# Patient Record
Sex: MALE | Race: WHITE | URBAN - METROPOLITAN AREA
[De-identification: names, ages, dates, MRNs, and addresses within clinical notes are randomized per-mention and may not be internally consistent; named-entity substitution may affect disease eponyms.]

---

## 2017-03-10 ENCOUNTER — OFFICE VISIT (OUTPATIENT)
Dept: FAMILY MEDICINE CLINIC | Age: 32
End: 2017-03-10

## 2017-03-10 VITALS
BODY MASS INDEX: 37.9 KG/M2 | RESPIRATION RATE: 15 BRPM | TEMPERATURE: 98.7 F | HEART RATE: 60 BPM | WEIGHT: 311.4 LBS | SYSTOLIC BLOOD PRESSURE: 130 MMHG | DIASTOLIC BLOOD PRESSURE: 70 MMHG

## 2017-03-10 DIAGNOSIS — F41.9 CHRONIC ANXIETY: ICD-10-CM

## 2017-03-10 PROCEDURE — 99213 OFFICE O/P EST LOW 20 MIN: CPT | Performed by: FAMILY MEDICINE

## 2017-03-10 PROCEDURE — G0444 DEPRESSION SCREEN ANNUAL: HCPCS | Performed by: FAMILY MEDICINE

## 2017-03-10 RX ORDER — ALPRAZOLAM 0.5 MG/1
0.5 TABLET ORAL 3 TIMES DAILY PRN
Qty: 20 TABLET | Refills: 0 | Status: SHIPPED | OUTPATIENT
Start: 2017-03-10 | End: 2017-04-17

## 2017-03-10 RX ORDER — PAROXETINE HYDROCHLORIDE 20 MG/1
20 TABLET, FILM COATED ORAL DAILY
Qty: 30 TABLET | Refills: 3 | Status: SHIPPED | OUTPATIENT
Start: 2017-03-10 | End: 2017-04-17 | Stop reason: SDUPTHER

## 2017-03-10 ASSESSMENT — PATIENT HEALTH QUESTIONNAIRE - PHQ9
SUM OF ALL RESPONSES TO PHQ QUESTIONS 1-9: 13
3. TROUBLE FALLING OR STAYING ASLEEP: 3
9. THOUGHTS THAT YOU WOULD BE BETTER OFF DEAD, OR OF HURTING YOURSELF: 0
2. FEELING DOWN, DEPRESSED OR HOPELESS: 1
1. LITTLE INTEREST OR PLEASURE IN DOING THINGS: 2
8. MOVING OR SPEAKING SO SLOWLY THAT OTHER PEOPLE COULD HAVE NOTICED. OR THE OPPOSITE, BEING SO FIGETY OR RESTLESS THAT YOU HAVE BEEN MOVING AROUND A LOT MORE THAN USUAL: 0
7. TROUBLE CONCENTRATING ON THINGS, SUCH AS READING THE NEWSPAPER OR WATCHING TELEVISION: 1
10. IF YOU CHECKED OFF ANY PROBLEMS, HOW DIFFICULT HAVE THESE PROBLEMS MADE IT FOR YOU TO DO YOUR WORK, TAKE CARE OF THINGS AT HOME, OR GET ALONG WITH OTHER PEOPLE: 2
SUM OF ALL RESPONSES TO PHQ9 QUESTIONS 1 & 2: 3
4. FEELING TIRED OR HAVING LITTLE ENERGY: 1
6. FEELING BAD ABOUT YOURSELF - OR THAT YOU ARE A FAILURE OR HAVE LET YOURSELF OR YOUR FAMILY DOWN: 2
5. POOR APPETITE OR OVEREATING: 3

## 2017-04-17 ENCOUNTER — OFFICE VISIT (OUTPATIENT)
Dept: FAMILY MEDICINE CLINIC | Age: 32
End: 2017-04-17

## 2017-04-17 VITALS
SYSTOLIC BLOOD PRESSURE: 124 MMHG | WEIGHT: 315 LBS | HEART RATE: 68 BPM | HEIGHT: 75 IN | TEMPERATURE: 98.1 F | BODY MASS INDEX: 39.17 KG/M2 | DIASTOLIC BLOOD PRESSURE: 70 MMHG | OXYGEN SATURATION: 98 % | RESPIRATION RATE: 18 BRPM

## 2017-04-17 DIAGNOSIS — F41.9 CHRONIC ANXIETY: Primary | ICD-10-CM

## 2017-04-17 PROCEDURE — 99213 OFFICE O/P EST LOW 20 MIN: CPT | Performed by: FAMILY MEDICINE

## 2017-04-17 RX ORDER — PAROXETINE 30 MG/1
30 TABLET, FILM COATED ORAL DAILY
Qty: 30 TABLET | Refills: 2 | Status: SHIPPED | OUTPATIENT
Start: 2017-04-17 | End: 2017-08-22 | Stop reason: SDUPTHER

## 2017-08-22 RX ORDER — PAROXETINE 30 MG/1
30 TABLET, FILM COATED ORAL DAILY
Qty: 30 TABLET | Refills: 3 | Status: SHIPPED | OUTPATIENT
Start: 2017-08-22 | End: 2020-07-02

## 2020-07-02 ENCOUNTER — OFFICE VISIT (OUTPATIENT)
Dept: FAMILY MEDICINE CLINIC | Age: 35
End: 2020-07-02
Payer: COMMERCIAL

## 2020-07-02 VITALS
HEIGHT: 75 IN | OXYGEN SATURATION: 98 % | HEART RATE: 68 BPM | DIASTOLIC BLOOD PRESSURE: 70 MMHG | WEIGHT: 315 LBS | BODY MASS INDEX: 39.17 KG/M2 | SYSTOLIC BLOOD PRESSURE: 136 MMHG | TEMPERATURE: 96.7 F

## 2020-07-02 PROCEDURE — 90472 IMMUNIZATION ADMIN EACH ADD: CPT | Performed by: NURSE PRACTITIONER

## 2020-07-02 PROCEDURE — 90715 TDAP VACCINE 7 YRS/> IM: CPT | Performed by: NURSE PRACTITIONER

## 2020-07-02 PROCEDURE — 90471 IMMUNIZATION ADMIN: CPT | Performed by: NURSE PRACTITIONER

## 2020-07-02 PROCEDURE — 99385 PREV VISIT NEW AGE 18-39: CPT | Performed by: NURSE PRACTITIONER

## 2020-07-02 PROCEDURE — 36415 COLL VENOUS BLD VENIPUNCTURE: CPT | Performed by: NURSE PRACTITIONER

## 2020-07-02 PROCEDURE — 90732 PPSV23 VACC 2 YRS+ SUBQ/IM: CPT | Performed by: NURSE PRACTITIONER

## 2020-07-02 ASSESSMENT — ENCOUNTER SYMPTOMS
SHORTNESS OF BREATH: 0
COUGH: 0
GASTROINTESTINAL NEGATIVE: 1
WHEEZING: 0
RESPIRATORY NEGATIVE: 1

## 2020-07-02 ASSESSMENT — PATIENT HEALTH QUESTIONNAIRE - PHQ9
SUM OF ALL RESPONSES TO PHQ QUESTIONS 1-9: 2
1. LITTLE INTEREST OR PLEASURE IN DOING THINGS: 0
2. FEELING DOWN, DEPRESSED OR HOPELESS: 2
SUM OF ALL RESPONSES TO PHQ9 QUESTIONS 1 & 2: 2
SUM OF ALL RESPONSES TO PHQ QUESTIONS 1-9: 2

## 2020-07-02 NOTE — PATIENT INSTRUCTIONS
Please make an appointment with one of our [address], Dr. Tasneem Owens or Asa Juarez MA. They will work with you to develop a plan to improve your overall well-being by addressing any behavioral or mental health factors that are influencing your health. You can schedule your appointment just like you schedule your other appointments here, at the  or over the phone. Each appointment will be 30 minutes long, and you will typically be seen every 2-4 weeks. Your insurance should cover the visit, but you may owe a specialist copay. If you would prefer to be seen by a therapist more frequently, or for a longer visit, please ask your Primary Care Provider for a recommendation.

## 2020-07-03 LAB
A/G RATIO: 1.8 (ref 1.1–2.2)
ALBUMIN SERPL-MCNC: 4.6 G/DL (ref 3.4–5)
ALP BLD-CCNC: 79 U/L (ref 40–129)
ALT SERPL-CCNC: 56 U/L (ref 10–40)
ANION GAP SERPL CALCULATED.3IONS-SCNC: 17 MMOL/L (ref 3–16)
AST SERPL-CCNC: 34 U/L (ref 15–37)
BASOPHILS ABSOLUTE: 0 K/UL (ref 0–0.2)
BASOPHILS RELATIVE PERCENT: 0.7 %
BILIRUB SERPL-MCNC: 1 MG/DL (ref 0–1)
BUN BLDV-MCNC: 10 MG/DL (ref 7–20)
CALCIUM SERPL-MCNC: 9.4 MG/DL (ref 8.3–10.6)
CHLORIDE BLD-SCNC: 103 MMOL/L (ref 99–110)
CHOLESTEROL, TOTAL: 154 MG/DL (ref 0–199)
CO2: 20 MMOL/L (ref 21–32)
CREAT SERPL-MCNC: 0.9 MG/DL (ref 0.9–1.3)
EOSINOPHILS ABSOLUTE: 0.4 K/UL (ref 0–0.6)
EOSINOPHILS RELATIVE PERCENT: 5.3 %
ESTIMATED AVERAGE GLUCOSE: 111.2 MG/DL
GFR AFRICAN AMERICAN: >60
GFR NON-AFRICAN AMERICAN: >60
GLOBULIN: 2.6 G/DL
GLUCOSE BLD-MCNC: 108 MG/DL (ref 70–99)
HBA1C MFR BLD: 5.5 %
HCT VFR BLD CALC: 46.9 % (ref 40.5–52.5)
HDLC SERPL-MCNC: 37 MG/DL (ref 40–60)
HEMOGLOBIN: 15.4 G/DL (ref 13.5–17.5)
LDL CHOLESTEROL CALCULATED: 95 MG/DL
LYMPHOCYTES ABSOLUTE: 1.6 K/UL (ref 1–5.1)
LYMPHOCYTES RELATIVE PERCENT: 24.2 %
MCH RBC QN AUTO: 29.1 PG (ref 26–34)
MCHC RBC AUTO-ENTMCNC: 32.9 G/DL (ref 31–36)
MCV RBC AUTO: 88.6 FL (ref 80–100)
MONOCYTES ABSOLUTE: 0.6 K/UL (ref 0–1.3)
MONOCYTES RELATIVE PERCENT: 8.4 %
NEUTROPHILS ABSOLUTE: 4.2 K/UL (ref 1.7–7.7)
NEUTROPHILS RELATIVE PERCENT: 61.4 %
PDW BLD-RTO: 15.5 % (ref 12.4–15.4)
PLATELET # BLD: 181 K/UL (ref 135–450)
PMV BLD AUTO: 9.1 FL (ref 5–10.5)
POTASSIUM SERPL-SCNC: 4.6 MMOL/L (ref 3.5–5.1)
RBC # BLD: 5.29 M/UL (ref 4.2–5.9)
SODIUM BLD-SCNC: 140 MMOL/L (ref 136–145)
TOTAL PROTEIN: 7.2 G/DL (ref 6.4–8.2)
TRIGL SERPL-MCNC: 109 MG/DL (ref 0–150)
VLDLC SERPL CALC-MCNC: 22 MG/DL
WBC # BLD: 6.8 K/UL (ref 4–11)

## 2021-03-16 ENCOUNTER — OFFICE VISIT (OUTPATIENT)
Dept: FAMILY MEDICINE CLINIC | Age: 36
End: 2021-03-16
Payer: COMMERCIAL

## 2021-03-16 VITALS
SYSTOLIC BLOOD PRESSURE: 118 MMHG | DIASTOLIC BLOOD PRESSURE: 66 MMHG | TEMPERATURE: 97.7 F | OXYGEN SATURATION: 96 % | BODY MASS INDEX: 46.9 KG/M2 | HEART RATE: 65 BPM | WEIGHT: 315 LBS

## 2021-03-16 DIAGNOSIS — N48.1 BALANITIS: ICD-10-CM

## 2021-03-16 DIAGNOSIS — R30.0 DYSURIA: Primary | ICD-10-CM

## 2021-03-16 LAB
BILIRUBIN, POC: ABNORMAL
BLOOD URINE, POC: ABNORMAL
CLARITY, POC: ABNORMAL
COLOR, POC: ABNORMAL
GLUCOSE URINE, POC: ABNORMAL
KETONES, POC: ABNORMAL
LEUKOCYTE EST, POC: ABNORMAL
NITRITE, POC: ABNORMAL
PH, POC: 5.5
PROTEIN, POC: ABNORMAL
SPECIFIC GRAVITY, POC: 1.02
UROBILINOGEN, POC: ABNORMAL

## 2021-03-16 PROCEDURE — 81002 URINALYSIS NONAUTO W/O SCOPE: CPT | Performed by: NURSE PRACTITIONER

## 2021-03-16 PROCEDURE — 99213 OFFICE O/P EST LOW 20 MIN: CPT | Performed by: NURSE PRACTITIONER

## 2021-03-16 RX ORDER — FLUCONAZOLE 150 MG/1
150 TABLET ORAL ONCE
Qty: 1 TABLET | Refills: 0 | Status: SHIPPED | OUTPATIENT
Start: 2021-03-16 | End: 2021-03-16

## 2021-03-16 RX ORDER — CEPHALEXIN 500 MG/1
500 CAPSULE ORAL 2 TIMES DAILY
Qty: 14 CAPSULE | Refills: 0 | Status: SHIPPED | OUTPATIENT
Start: 2021-03-16 | End: 2021-03-23

## 2021-03-16 ASSESSMENT — PATIENT HEALTH QUESTIONNAIRE - PHQ9
SUM OF ALL RESPONSES TO PHQ QUESTIONS 1-9: 0
2. FEELING DOWN, DEPRESSED OR HOPELESS: 0
SUM OF ALL RESPONSES TO PHQ QUESTIONS 1-9: 0

## 2021-03-16 ASSESSMENT — ENCOUNTER SYMPTOMS
GASTROINTESTINAL NEGATIVE: 1
RESPIRATORY NEGATIVE: 1

## 2021-03-17 ENCOUNTER — PATIENT MESSAGE (OUTPATIENT)
Dept: FAMILY MEDICINE CLINIC | Age: 36
End: 2021-03-17

## 2021-03-17 DIAGNOSIS — R81 GLUCOSE FOUND IN URINE ON EXAMINATION: Primary | ICD-10-CM

## 2021-03-17 LAB — URINE CULTURE, ROUTINE: NORMAL

## 2021-03-17 NOTE — PROGRESS NOTES
file    Food insecurity     Worry: Not on file     Inability: Not on file    Transportation needs     Medical: Not on file     Non-medical: Not on file   Tobacco Use    Smoking status: Former Smoker     Types: Cigars    Smokeless tobacco: Former User     Types: Chew     Quit date: 6/2/2020   Substance and Sexual Activity    Alcohol use: Yes     Alcohol/week: 9.0 standard drinks     Types: 9 Cans of beer per week     Comment: monthly     Drug use: No    Sexual activity: Yes     Partners: Female   Lifestyle    Physical activity     Days per week: Not on file     Minutes per session: Not on file    Stress: Not on file   Relationships    Social connections     Talks on phone: Not on file     Gets together: Not on file     Attends Shinto service: Not on file     Active member of club or organization: Not on file     Attends meetings of clubs or organizations: Not on file     Relationship status: Not on file    Intimate partner violence     Fear of current or ex partner: Not on file     Emotionally abused: Not on file     Physically abused: Not on file     Forced sexual activity: Not on file   Other Topics Concern    Not on file   Social History Narrative    Not on file        Family History   Problem Relation Age of Onset    High Blood Pressure Mother     Diabetes Father         type 1    Other Father         kidney transplant    High Blood Pressure Maternal Uncle        Vitals:    07/02/20 0813   BP: 136/70   Pulse: 68   Temp: 96.7 °F (35.9 °C)   TempSrc: Temporal   SpO2: 98%   Weight: (!) 380 lb (172.4 kg)   Height: 6' 3\" (1.905 m)     Estimated body mass index is 47.5 kg/m² as calculated from the following:    Height as of this encounter: 6' 3\" (1.905 m). Weight as of this encounter: 380 lb (172.4 kg). Physical Exam  Vitals signs reviewed.    HENT:      Right Ear: Tympanic membrane, ear canal and external ear normal.      Left Ear: Tympanic membrane, ear canal and external ear normal. Cardiovascular:      Rate and Rhythm: Normal rate and regular rhythm. Heart sounds: Normal heart sounds. Pulmonary:      Effort: Pulmonary effort is normal.      Breath sounds: Normal breath sounds. Skin:     General: Skin is warm and dry. Neurological:      Mental Status: He is alert and oriented to person, place, and time. Psychiatric:         Mood and Affect: Mood normal.         Behavior: Behavior normal.         ASSESSMENT/PLAN:  1. Encounter to establish care  See below    2. Wellness examination  Blood work checked as below. Patient educated on diet and exercise. - Hemoglobin A1C  - Comprehensive Metabolic Panel  - Lipid Panel  - CBC Auto Differential    3. Glucose found in urine on examination  Will check blood work today. - Hemoglobin A1C  - Comprehensive Metabolic Panel    4. Need for pneumococcal vaccine  - PNEUMOVAX 23 subcutaneous/IM (Pneumococcal polysaccharide vaccine 23-valent >= 1yo)    5. Need for diphtheria-tetanus-pertussis (Tdap) vaccine  - Tdap (age 6y and older) IM ([address])    Anxiety  Will consider seeing our [facility]. Patient does not want any medications right now. Follow up after blood work. An  electronic signature was used to authenticate this note.     --Yakov Oconnell, APRN - CNP on 7/2/2020 at 9:56 AM IV discontinued, cath removed intact

## 2022-06-06 ENCOUNTER — TELEPHONE (OUTPATIENT)
Dept: FAMILY MEDICINE CLINIC | Age: 37
End: 2022-06-06

## 2022-06-06 ENCOUNTER — OFFICE VISIT (OUTPATIENT)
Dept: ORTHOPEDIC SURGERY | Age: 37
End: 2022-06-06

## 2022-06-06 DIAGNOSIS — M25.562 ACUTE PAIN OF LEFT KNEE: Primary | ICD-10-CM

## 2022-06-06 PROCEDURE — 99203 OFFICE O/P NEW LOW 30 MIN: CPT | Performed by: PHYSICIAN ASSISTANT

## 2022-06-06 RX ORDER — METHYLPREDNISOLONE 4 MG/1
TABLET ORAL
Qty: 1 KIT | Refills: 0 | Status: SHIPPED | OUTPATIENT
Start: 2022-06-06 | End: 2022-06-12

## 2022-06-06 NOTE — PROGRESS NOTES
This dictation was done with [employer] dictation and may contain mechanical errors related to translation. I have today reviewed with Chauncey Severe the clinically relevant, past medical history, medications, allergies, family history, social history, and Review Of Systems form the patients most recent history form & I have documented any details relevant to today's presenting complaints in my history below. Mr. Jackalyn Goldberg Lee's self-reported past medical history, medications, allergies, family history, social history, and Review Of Systems form has been scanned into the chart under the \"Media\" tab. Subjective:  Chauncey Severe is a 40 y.o. who is here as a new patient to [place] [address] after tweaking his knee golfing yesterday. He denies any significant history. He had a sprain knee a few years back that went away without any symptoms. He is a  who is off for the summer. He has been golfing getting ready for a trip to [place] next week when he was in the sand and turned to hit and felt sudden sharp pain in the knee and then it was hard walking. He has some swelling and soreness but he wants to get it checked out so he comes here for [facility] after-hours [facility]  . He was sent for an AP lateral sunrise and tunnel view. Patient Active Problem List   Diagnosis    Viral meningitis    Chronic anxiety           No current outpatient medications on file prior to visit. No current facility-administered medications on file prior to visit. Objective: There were no vitals taken for this visit. On examination is a pleasant 70-year-old gentleman who is overweight at 46.9 BMI. He is got 0 to 140 degrees of motion with both knees mildly positive Michael on the left but I could not get a palpable click as much his soreness posterior to the knee joint.   His patella is tracking well he is got decent quad tone good dorsiflexion plantarflexion strength negative for Lachman. Neuro exam grossly intact both lower extremities. Intact sensation to light touch. Motor exam 4+ to 5/5 in all major motor groups. Negative Casey's sign. Skin is warm, dry and intact with out erythema or significant increased temperature around the knee joint(s). There are no cutaneous lesions or lymphadenopathy present. X-RAYS:  X-rays taken the office today show very minimal joint space narrowing and subchondral sclerosis in the medial compartments of both knees. No fractures or other bone abnormalities are noted. Assessment:  Bilateral mild degenerative changes in the medial knee with possible medial meniscus injury on the left knee    Plan:  During today's visit, there was approximately 30 minutes of face-to-face discussion in regards to the patient's current condition and treatment options. More than 50 % of the time was counseling and coordination of care as indicated above.   At this point I applied a compressive wrap and prescribed a Medrol Dosepak he is going to elevate it and ice it avoid hyperflexing it and follow-up in the next 2 to 3 days with Dr. Crystal Richardson NOTE:   X-rays examination and referral for consultation      They will schedule a follow up in 1 to 2 days

## 2022-06-06 NOTE — TELEPHONE ENCOUNTER
Received call from [place] [id number] Triage and Pt's wife stating that Pt twisted his left knee yesterday while golfing. Wife states that he can barely walk and is putting all of his weight on his right leg.      No appts

## 2023-06-22 ENCOUNTER — TELEPHONE (OUTPATIENT)
Dept: FAMILY MEDICINE CLINIC | Age: 38
End: 2023-06-22

## 2023-06-22 NOTE — TELEPHONE ENCOUNTER
MA reached out to patient regarding no show / missed appt.     No answer, left message to call office to reschedule appointment